# Patient Record
Sex: FEMALE | Race: WHITE | NOT HISPANIC OR LATINO | URBAN - METROPOLITAN AREA
[De-identification: names, ages, dates, MRNs, and addresses within clinical notes are randomized per-mention and may not be internally consistent; named-entity substitution may affect disease eponyms.]

---

## 2018-04-09 PROCEDURE — 87086 URINE CULTURE/COLONY COUNT: CPT | Performed by: INTERNAL MEDICINE

## 2018-05-04 PROCEDURE — 88108 CYTOPATH CONCENTRATE TECH: CPT | Performed by: UROLOGY

## 2018-05-25 PROCEDURE — 88305 TISSUE EXAM BY PATHOLOGIST: CPT | Performed by: UROLOGY

## 2018-09-24 PROCEDURE — 88175 CYTOPATH C/V AUTO FLUID REDO: CPT | Performed by: INTERNAL MEDICINE

## 2019-06-25 ENCOUNTER — WALK IN (OUTPATIENT)
Dept: URGENT CARE | Age: 44
End: 2019-06-25

## 2019-06-25 VITALS
BODY MASS INDEX: 28.61 KG/M2 | HEIGHT: 66 IN | OXYGEN SATURATION: 98 % | RESPIRATION RATE: 15 BRPM | TEMPERATURE: 98.8 F | HEART RATE: 72 BPM | WEIGHT: 178 LBS

## 2019-06-25 DIAGNOSIS — B34.9 PHARYNGITIS WITH VIRAL SYNDROME: Primary | ICD-10-CM

## 2019-06-25 DIAGNOSIS — J02.9 PHARYNGITIS WITH VIRAL SYNDROME: Primary | ICD-10-CM

## 2019-06-25 LAB
INTERNAL PROCEDURAL CONTROLS ACCEPTABLE: YES
S PYO AG THROAT QL IA.RAPID: NEGATIVE

## 2019-06-25 PROCEDURE — 87880 STREP A ASSAY W/OPTIC: CPT | Performed by: NURSE PRACTITIONER

## 2019-06-25 PROCEDURE — 99203 OFFICE O/P NEW LOW 30 MIN: CPT | Performed by: NURSE PRACTITIONER

## 2019-06-25 ASSESSMENT — ENCOUNTER SYMPTOMS
NAUSEA: 1
SINUS PRESSURE: 0
SORE THROAT: 1
CONSTITUTIONAL NEGATIVE: 1
TROUBLE SWALLOWING: 0
HEADACHES: 0
SINUS PAIN: 1
SWOLLEN GLANDS: 1
CHILLS: 0
SHORTNESS OF BREATH: 0
EYES NEGATIVE: 1
WHEEZING: 0
COUGH: 1
FEVER: 0
ALLERGIC/IMMUNOLOGIC NEGATIVE: 1

## 2024-09-24 ENCOUNTER — LAB ENCOUNTER (OUTPATIENT)
Dept: LAB | Age: 49
End: 2024-09-24
Attending: STUDENT IN AN ORGANIZED HEALTH CARE EDUCATION/TRAINING PROGRAM
Payer: COMMERCIAL

## 2024-09-24 DIAGNOSIS — Z01.818 PREOP TESTING: ICD-10-CM

## 2024-09-24 LAB
BASOPHILS # BLD AUTO: 0.07 X10(3) UL (ref 0–0.2)
BASOPHILS NFR BLD AUTO: 0.9 %
DEPRECATED RDW RBC AUTO: 47.1 FL (ref 35.1–46.3)
EOSINOPHIL # BLD AUTO: 0.13 X10(3) UL (ref 0–0.7)
EOSINOPHIL NFR BLD AUTO: 1.6 %
ERYTHROCYTE [DISTWIDTH] IN BLOOD BY AUTOMATED COUNT: 13.2 % (ref 11–15)
HCT VFR BLD AUTO: 38.6 %
HGB BLD-MCNC: 13.1 G/DL
IMM GRANULOCYTES # BLD AUTO: 0.03 X10(3) UL (ref 0–1)
IMM GRANULOCYTES NFR BLD: 0.4 %
LYMPHOCYTES # BLD AUTO: 1.77 X10(3) UL (ref 1–4)
LYMPHOCYTES NFR BLD AUTO: 22.4 %
MCH RBC QN AUTO: 33.6 PG (ref 26–34)
MCHC RBC AUTO-ENTMCNC: 33.9 G/DL (ref 31–37)
MCV RBC AUTO: 99 FL
MONOCYTES # BLD AUTO: 0.59 X10(3) UL (ref 0.1–1)
MONOCYTES NFR BLD AUTO: 7.5 %
NEUTROPHILS # BLD AUTO: 5.31 X10 (3) UL (ref 1.5–7.7)
NEUTROPHILS # BLD AUTO: 5.31 X10(3) UL (ref 1.5–7.7)
NEUTROPHILS NFR BLD AUTO: 67.2 %
PLATELET # BLD AUTO: 269 10(3)UL (ref 150–450)
RBC # BLD AUTO: 3.9 X10(6)UL
WBC # BLD AUTO: 7.9 X10(3) UL (ref 4–11)

## 2024-09-24 PROCEDURE — 36415 COLL VENOUS BLD VENIPUNCTURE: CPT

## 2024-09-24 PROCEDURE — 85025 COMPLETE CBC W/AUTO DIFF WBC: CPT

## 2024-09-29 ENCOUNTER — ANESTHESIA EVENT (OUTPATIENT)
Dept: SURGERY | Facility: HOSPITAL | Age: 49
End: 2024-09-29
Payer: COMMERCIAL

## 2024-09-30 ENCOUNTER — HOSPITAL ENCOUNTER (OUTPATIENT)
Facility: HOSPITAL | Age: 49
Setting detail: HOSPITAL OUTPATIENT SURGERY
Discharge: HOME | End: 2024-09-30
Attending: STUDENT IN AN ORGANIZED HEALTH CARE EDUCATION/TRAINING PROGRAM | Admitting: STUDENT IN AN ORGANIZED HEALTH CARE EDUCATION/TRAINING PROGRAM
Payer: COMMERCIAL

## 2024-09-30 ENCOUNTER — ANESTHESIA (OUTPATIENT)
Dept: SURGERY | Facility: HOSPITAL | Age: 49
End: 2024-09-30
Payer: COMMERCIAL

## 2024-09-30 VITALS
BODY MASS INDEX: 30.53 KG/M2 | TEMPERATURE: 98 F | HEART RATE: 59 BPM | RESPIRATION RATE: 16 BRPM | DIASTOLIC BLOOD PRESSURE: 71 MMHG | HEIGHT: 66 IN | SYSTOLIC BLOOD PRESSURE: 127 MMHG | WEIGHT: 190 LBS | OXYGEN SATURATION: 100 %

## 2024-09-30 DIAGNOSIS — Z01.818 PREOP TESTING: Primary | ICD-10-CM

## 2024-09-30 LAB — B-HCG UR QL: NEGATIVE

## 2024-09-30 PROCEDURE — 81025 URINE PREGNANCY TEST: CPT

## 2024-09-30 PROCEDURE — 0UBC8ZZ EXCISION OF CERVIX, VIA NATURAL OR ARTIFICIAL OPENING ENDOSCOPIC: ICD-10-PCS | Performed by: STUDENT IN AN ORGANIZED HEALTH CARE EDUCATION/TRAINING PROGRAM

## 2024-09-30 PROCEDURE — 88305 TISSUE EXAM BY PATHOLOGIST: CPT | Performed by: STUDENT IN AN ORGANIZED HEALTH CARE EDUCATION/TRAINING PROGRAM

## 2024-09-30 RX ORDER — METOCLOPRAMIDE HYDROCHLORIDE 5 MG/ML
10 INJECTION INTRAMUSCULAR; INTRAVENOUS ONCE
Status: COMPLETED | OUTPATIENT
Start: 2024-09-30 | End: 2024-09-30

## 2024-09-30 RX ORDER — MORPHINE SULFATE 10 MG/ML
6 INJECTION, SOLUTION INTRAMUSCULAR; INTRAVENOUS EVERY 10 MIN PRN
Status: DISCONTINUED | OUTPATIENT
Start: 2024-09-30 | End: 2024-09-30

## 2024-09-30 RX ORDER — FAMOTIDINE 20 MG/1
20 TABLET, FILM COATED ORAL ONCE
Status: COMPLETED | OUTPATIENT
Start: 2024-09-30 | End: 2024-09-30

## 2024-09-30 RX ORDER — PROCHLORPERAZINE EDISYLATE 5 MG/ML
5 INJECTION INTRAMUSCULAR; INTRAVENOUS EVERY 8 HOURS PRN
Status: DISCONTINUED | OUTPATIENT
Start: 2024-09-30 | End: 2024-09-30

## 2024-09-30 RX ORDER — HYDROMORPHONE HYDROCHLORIDE 1 MG/ML
0.6 INJECTION, SOLUTION INTRAMUSCULAR; INTRAVENOUS; SUBCUTANEOUS EVERY 5 MIN PRN
Status: DISCONTINUED | OUTPATIENT
Start: 2024-09-30 | End: 2024-09-30

## 2024-09-30 RX ORDER — ONDANSETRON 2 MG/ML
4 INJECTION INTRAMUSCULAR; INTRAVENOUS EVERY 6 HOURS PRN
Status: DISCONTINUED | OUTPATIENT
Start: 2024-09-30 | End: 2024-09-30

## 2024-09-30 RX ORDER — MORPHINE SULFATE 4 MG/ML
2 INJECTION, SOLUTION INTRAMUSCULAR; INTRAVENOUS EVERY 10 MIN PRN
Status: DISCONTINUED | OUTPATIENT
Start: 2024-09-30 | End: 2024-09-30

## 2024-09-30 RX ORDER — HYDROMORPHONE HYDROCHLORIDE 1 MG/ML
0.2 INJECTION, SOLUTION INTRAMUSCULAR; INTRAVENOUS; SUBCUTANEOUS EVERY 5 MIN PRN
Status: DISCONTINUED | OUTPATIENT
Start: 2024-09-30 | End: 2024-09-30

## 2024-09-30 RX ORDER — KETOROLAC TROMETHAMINE 30 MG/ML
INJECTION, SOLUTION INTRAMUSCULAR; INTRAVENOUS AS NEEDED
Status: DISCONTINUED | OUTPATIENT
Start: 2024-09-30 | End: 2024-09-30 | Stop reason: SURG

## 2024-09-30 RX ORDER — FAMOTIDINE 10 MG/ML
20 INJECTION, SOLUTION INTRAVENOUS ONCE
Status: COMPLETED | OUTPATIENT
Start: 2024-09-30 | End: 2024-09-30

## 2024-09-30 RX ORDER — SODIUM CHLORIDE, SODIUM LACTATE, POTASSIUM CHLORIDE, CALCIUM CHLORIDE 600; 310; 30; 20 MG/100ML; MG/100ML; MG/100ML; MG/100ML
INJECTION, SOLUTION INTRAVENOUS CONTINUOUS
Status: DISCONTINUED | OUTPATIENT
Start: 2024-09-30 | End: 2024-09-30

## 2024-09-30 RX ORDER — HYDROMORPHONE HYDROCHLORIDE 1 MG/ML
0.4 INJECTION, SOLUTION INTRAMUSCULAR; INTRAVENOUS; SUBCUTANEOUS EVERY 5 MIN PRN
Status: DISCONTINUED | OUTPATIENT
Start: 2024-09-30 | End: 2024-09-30

## 2024-09-30 RX ORDER — NALOXONE HYDROCHLORIDE 0.4 MG/ML
80 INJECTION, SOLUTION INTRAMUSCULAR; INTRAVENOUS; SUBCUTANEOUS AS NEEDED
Status: DISCONTINUED | OUTPATIENT
Start: 2024-09-30 | End: 2024-09-30

## 2024-09-30 RX ORDER — ACETAMINOPHEN 500 MG
1000 TABLET ORAL ONCE
Status: COMPLETED | OUTPATIENT
Start: 2024-09-30 | End: 2024-09-30

## 2024-09-30 RX ORDER — ONDANSETRON 2 MG/ML
INJECTION INTRAMUSCULAR; INTRAVENOUS AS NEEDED
Status: DISCONTINUED | OUTPATIENT
Start: 2024-09-30 | End: 2024-09-30 | Stop reason: SURG

## 2024-09-30 RX ORDER — MORPHINE SULFATE 4 MG/ML
4 INJECTION, SOLUTION INTRAMUSCULAR; INTRAVENOUS EVERY 10 MIN PRN
Status: DISCONTINUED | OUTPATIENT
Start: 2024-09-30 | End: 2024-09-30

## 2024-09-30 RX ORDER — LIDOCAINE HYDROCHLORIDE 10 MG/ML
INJECTION, SOLUTION EPIDURAL; INFILTRATION; INTRACAUDAL; PERINEURAL AS NEEDED
Status: DISCONTINUED | OUTPATIENT
Start: 2024-09-30 | End: 2024-09-30 | Stop reason: SURG

## 2024-09-30 RX ORDER — METOCLOPRAMIDE 10 MG/1
10 TABLET ORAL ONCE
Status: COMPLETED | OUTPATIENT
Start: 2024-09-30 | End: 2024-09-30

## 2024-09-30 RX ADMIN — KETOROLAC TROMETHAMINE 30 MG: 30 INJECTION, SOLUTION INTRAMUSCULAR; INTRAVENOUS at 09:54:00

## 2024-09-30 RX ADMIN — LIDOCAINE HYDROCHLORIDE 50 MG: 10 INJECTION, SOLUTION EPIDURAL; INFILTRATION; INTRACAUDAL; PERINEURAL at 09:02:00

## 2024-09-30 RX ADMIN — ONDANSETRON 4 MG: 2 INJECTION INTRAMUSCULAR; INTRAVENOUS at 09:20:00

## 2024-09-30 RX ADMIN — SODIUM CHLORIDE, SODIUM LACTATE, POTASSIUM CHLORIDE, CALCIUM CHLORIDE: 600; 310; 30; 20 INJECTION, SOLUTION INTRAVENOUS at 09:52:00

## 2024-09-30 NOTE — ANESTHESIA PREPROCEDURE EVALUATION
Anesthesia PreOp Note    HPI:     Constanza Galvez is a 49 year old female who presents for preoperative consultation requested by: Madeleine Jacobson DO    Date of Surgery: 9/30/2024    Procedure(s):  Hysteroscopy with polypectomy  Indication: Menorrhagia, uterine polyp    Relevant Problems   No relevant active problems       NPO:  Last Liquid Consumption Date: 09/29/24  Last Liquid Consumption Time: 1830  Last Solid Consumption Date: 09/29/24  Last Solid Consumption Time: 1830  Last Liquid Consumption Date: 09/29/24          History Review:  Patient Active Problem List    Diagnosis Date Noted    GBS (group B streptococcus) UTI complicating pregnancy (HCC) 04/23/2012       Past Medical History:    ANEMIA    BACK PAIN    VARICELLA    at age 12       Past Surgical History:   Procedure Laterality Date    Anesth,knee arthroscopy      left/lateral release    Colonoscopy      Other surgical history  05/25/2018    Cysto- Dr. Darby    Repair femoral hernia,reducible      right       Medications Prior to Admission   Medication Sig Dispense Refill Last Dose    APPLE CIDER VINEGAR OR Take by mouth.   9/23/2024    TURMERIC OR Take by mouth.   9/23/2024    Probiotic Product (PROBIOTIC ACIDOPHILUS BEADS OR) Take by mouth.   9/23/2024    Calcium Carbonate (CALCIUM 500 OR) Take  by mouth.   9/23/2024    Omega-3 Fatty Acids (FISH OIL OR) daily.   9/23/2024    MULTIVITAMIN/MINERALS OR daily   9/23/2024     Current Facility-Administered Medications Ordered in Epic   Medication Dose Route Frequency Provider Last Rate Last Admin    lactated ringers infusion   Intravenous Continuous Madeleine Jacobson DO 20 mL/hr at 09/30/24 0730 New Bag at 09/30/24 0730     No current [facility]-ordered outpatient medications on file.       No Known Allergies    Family History   Problem Relation Age of Onset    Cancer Paternal Grandmother         breast and lung    Breast Cancer Paternal Grandmother 55        55    Cancer Maternal Grandfather         lymphoma     Diabetes Father     Hypertension Father     Hypertension Mother     Arthritis Mother         rheumatoid arthritis    Cancer Paternal Grandfather         pancreatic     Social History     Socioeconomic History    Marital status:    Occupational History    Occupation:    Tobacco Use    Smoking status: Never    Smokeless tobacco: Never   Vaping Use    Vaping status: Never Used   Substance and Sexual Activity    Alcohol use: Yes     Alcohol/week: 0.0 standard drinks of alcohol     Comment: 4-6 wine/week    Drug use: No    Sexual activity: Yes     Partners: Male     Birth control/protection: Pill       Available pre-op labs reviewed.  Lab Results   Component Value Date    WBC 7.9 09/24/2024    RBC 3.90 09/24/2024    HGB 13.1 09/24/2024    HCT 38.6 09/24/2024    MCV 99.0 09/24/2024    MCH 33.6 09/24/2024    MCHC 33.9 09/24/2024    RDW 13.2 09/24/2024    .0 09/24/2024    URINEPREG Negative 09/30/2024             Vital Signs:  Body mass index is 30.67 kg/m².   height is 1.676 m (5' 6\") and weight is 86.2 kg (190 lb). Her oral temperature is 97.7 °F (36.5 °C). Her blood pressure is 143/74 and her pulse is 63. Her respiration is 12 and oxygen saturation is 99%.   Vitals:    09/23/24 1056 09/30/24 0755   BP:  143/74   Pulse:  63   Resp:  12   Temp:  97.7 °F (36.5 °C)   TempSrc:  Oral   SpO2:  99%   Weight: 86.2 kg (190 lb) 86.2 kg (190 lb)   Height: 1.676 m (5' 6\") 1.676 m (5' 6\")        Anesthesia Evaluation     Patient summary reviewed and Nursing notes reviewed    Airway   Mallampati: II  Dental      Pulmonary - negative ROS   Cardiovascular   Exercise tolerance: good    NYHA Classification: I    Neuro/Psych - negative ROS     GI/Hepatic/Renal - negative ROS     Endo/Other - negative ROS   Abdominal                  Anesthesia Plan:   ASA:  2  Plan:   MAC  Post-op Pain Management: IV analgesics      I have informed Constanza Galvez and/or legal guardian or family member of the nature of the  anesthetic plan, benefits, risks including possible dental damage if relevant, major complications, and any alternative forms of anesthetic management.   All of the patient's questions were answered to the best of my ability. The patient desires the anesthetic management as planned.  CARRIE ALEXANDRE MD  9/30/2024 8:25 AM  Present on Admission:  **None**

## 2024-09-30 NOTE — H&P
[facility]  part of [facility]    History & Physical    Constanza Galvez Patient Status:  Hospital Outpatient Surgery    3/24/1975 MRN [id number]   Location [facility] PRE OP RECOVERY Attending Madeleine Jacobson DO   Hosp Day # 0 PCP Mackenzie Coko MD        Date:  2024  Date of Admission:  2024    History provided by:patient  Chief Complaint:   Scheduled surgery    HPI:   Constanza Galvez is a(n) 49 year old female presents for scheduled hysteroscopy dilation, curettage, and polypectomy.   Heavy menstrual bleeding x1 year > scheduled and cancelled previously due to insurance issues.     History     Past Medical History:    ANEMIA    BACK PAIN    VARICELLA    at age 12     Past Surgical History:   Procedure Laterality Date    Anesth,knee arthroscopy      left/lateral release    Colonoscopy      Other surgical history  2018    Cysto- Dr. Darby    Repair femoral hernia,reducible      right     Family History   Problem Relation Age of Onset    Cancer Paternal Grandmother         breast and lung    Breast Cancer Paternal Grandmother 55        55    Cancer Maternal Grandfather         lymphoma    Diabetes Father     Hypertension Father     Hypertension Mother     Arthritis Mother         rheumatoid arthritis    Cancer Paternal Grandfather         pancreatic     Social History:  Social History     Socioeconomic History    Marital status:    Occupational History    Occupation:    Tobacco Use    Smoking status: Never    Smokeless tobacco: Never   Vaping Use    Vaping status: Never Used   Substance and Sexual Activity    Alcohol use: Yes     Alcohol/week: 0.0 standard drinks of alcohol     Comment: 4-6 wine/week    Drug use: No    Sexual activity: Yes     Partners: Male     Birth control/protection: Pill   Social History Narrative    , 3 sons.  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  Going to gym 2-3x for exercise.         Allergies/Medications:    Allergies: No Known Allergies  Medications Prior to Admission   Medication Sig    APPLE CIDER VINEGAR OR Take by mouth.    TURMERIC OR Take by mouth.    Probiotic Product (PROBIOTIC ACIDOPHILUS BEADS OR) Take by mouth.    Calcium Carbonate (CALCIUM 500 OR) Take  by mouth.    Omega-3 Fatty Acids (FISH OIL OR) daily.    MULTIVITAMIN/MINERALS OR daily       Review of Systems:   Pertinent items are noted in HPI.    Physical Exam:   Vital Signs:  Blood pressure 143/74, pulse 63, temperature 97.7 °F (36.5 °C), temperature source Oral, resp. rate 12, height 5' 6\" (1.676 m), weight 190 lb (86.2 kg), last menstrual period 08/28/2024, SpO2 99%.     General: No acute distress. Alert and oriented x 3.  HEENT: Moist mucous membranes. EOM-I. PERRL  Respiratory: Clear to auscultation bilaterally.  No wheezes. No rhonchi.  Cardiovascular: regular rate, rhythm  Abdomen: Soft, nontender, nondistended.    Neurologic: No focal neurological deficits.  Musculoskeletal: Full range of motion of all extremities.  No swelling noted.  Integument: No lesions. No erythema.  Psychiatric: Appropriate mood and affect.                  Results:     Lab Results   Component Value Date    WBC 7.9 09/24/2024    HGB 13.1 09/24/2024    HCT 38.6 09/24/2024    .0 09/24/2024    CREATSERUM 0.63 10/19/2021    BUN 10.0 10/19/2021     10/19/2021    K 4.22 10/19/2021     10/19/2021    CO2 25.7 10/19/2021    GLU 82 10/19/2021    CA 9.5 10/19/2021    ALB 4.0 08/05/2016    ALKPHO 82 08/05/2016    BILT 0.73 08/05/2016    TP 7.5 08/05/2016    AST 19 08/05/2016    ALT 19 08/05/2016    T4F 1.09 03/20/2012    TSH 0.835 08/05/2016    RPR Non Reactive 07/26/2012       No results found.        FINDINGS:     UTERUS:   The uterus is anteverted and measures 8.7 x 5.3 x 3.8 cm. Total volume is 90.6 mL.   The myometrium is homogeneous.   The endometrial echo complex measures up to 8.4 mm. It demonstrates some mild heterogeneity within   the area of  homogeneous hyperechogenicity along the posterior endometrial wall just to left of   midline measuring 0.5 x 0.5 x 0.6 cm without significant vascularity.     RIGHT OVARY:   The right ovary measures 3.1 x 2.4 x 1.3 cm. Total volume is 5.1 mL.        LEFT OVARY:   The left ovary measures 2.1 x 1.3 x 1.3 cm. Total volume is 1.8 mL.     Assessment/Plan:     * No active hospital problems. *    48 yo with abnormal uterine bleeding secondary to endometrial polyp     - Discussed risks of surgery including infection, bleeding, perforation. Consents signed.   - Same day surgery  - No antibiotics      Madeleine Jacobson DO  9/30/2024

## 2024-09-30 NOTE — OPERATIVE REPORT
[facility]  part of [facility]     Operative Note    Constanza Galvez Patient Status:  Hospital Outpatient Surgery    3/24/1975 MRN [id number]   Location [facility] [place] OPERATING ROOM Attending Madeleine Jacobson DO   Hosp Day # 0 PCP Mackenzie Cook MD     Pre Op Diagnosis: Heavy menstrual bleeding    Post Op Diagnosis:  Endocervical polyp    Procedure:  Procedure(s):  Hysteroscopy with polypectomy    Surgeon:  Madeleine Jacobson DO    Anesthesia:  MAC      Patient was consented and taken to the operating room.   She received MAC anesthesia and was positioned in the dorsal lithotomy position. She was prepped and draped in the normal usual fashion.   Bladder was drained with straight catheter. Speculum was placed in the vagina and cervical polyp was noted with a broad base from the ecto and endocervix. Ring forcep was used to remove the portion of the external cervix with gently turning traction.   Hysteroscopy was then introduced into the uterus and small base of polyp just in endocervix was noted.  Endometrial cavity was surveyed. Soft tissue mini shaver was used to remove remaining portion of the endocervical polyp. There was also a small polyp noted in the posterior left lateral wall of the endometrium that was shaved. Hysteroscope then removed. Sharp curettage was performed of the uterus.   After removal of all instruments there was small oozing bleeding noted from the site of the ectocervical polyp that was not controlled with pressure. A single figure of 8 suture with 3-0 Vicryl rapide was placed to obtain hemostasis.   All instruments removed from the vagina.     Complications: none     EBL: 25 cc    Specimens:   Cervical Polyp  Endometrial polyp and curetting    Findings:  Broads based ecto/endo cervical polyp at the 7oclock position  Small posterior lateral wall endometrial polyp      Madeleine Jacobson DO

## 2024-09-30 NOTE — DISCHARGE INSTRUCTIONS
What happens after hysteroscopy?  You may have cramps and bleeding for short time after the procedure. This is normal. Use pads instead of tampons.  Don't douche or use tampons until your healthcare provider says it’s OK.  Don't use any vaginal medicines until you are told it’s OK.  Ask your healthcare provider when it’s OK to have sex again.  No baths or tubes. Only shower until your provider says its ok     When to call your healthcare provider  Call your healthcare provider if any of the following occur:  Heavy bleeding (more than 1 pad an hour for 2 or more hours)  A fever of 100.4°F ( 38.0°C) or higher, or as directed by your provider  Increasing belly (abdominal) pain or soreness  Bad-smelling discharge  Follow-up care  Schedule a follow-up visit with your healthcare provider. Based on your test results, you may need more treatment. Be sure to follow directions and keep your appointments.      [facility] HOME CARE INSTRUCTIONS: POST-OP ANESTHESIA  The medication that you received for sedation or general anesthesia can last up to 24 hours. Your judgment and reflexes may be altered, even if you feel like your normal self.      We Recommend:   Do not drive any motor vehicle or bicycle   Avoid mowing the lawn, playing sports, or working with power tools/applicances (power saws, electric knives or mixers)   That you have someone stay with you on your first night home   Do not drink alcohol or take sleeping pills or tranquilizers   Do not sign legal documents within 24 hours of your procedure   If you had a nerve block for your surgery, take extra care not to put any pressure on your arm or hand for 24 hours    It is normal:  For you to have a sore throat if you had a breathing tube during surgery (while you were asleep!). The sore throat should get better within 48 hours. You can gargle with warm salt water (1/2 tsp in 4 oz warm water) or use a throat lozenge for comfort  To feel muscle aches or soreness especially in  the abdomen, chest or neck. The achy feeling should go away in the next 24 hours  To feel weak, sleepy or \"wiped out\". Your should start feeling better in the next 24 hours.   To experience mild discomforts such as sore lip or tongue, headache, cramps, gas pains or a bloated feeling in your abdomen.   To experience mild back pain or soreness for a day or two if you had spinal or epidural anesthesia.   If you had laparoscopic surgery, to feel shoulder pain or discomfort on the day of surgery.   For some patients to have nausea after surgery/anesthesia    If you feel nausea or experience vomiting:   Try to move around less.   Eat less than usual or drink only liquids until the next morning   Nausea should resolve in about 24 hours    If you have a problem when you are at home:    Call your surgeons office       Discharge Instructions: After Your Surgery  You’ve just had surgery. During surgery, you were given medicine called anesthesia to keep you relaxed and free of pain. After surgery, you may have some pain or nausea. This is common. Here are some tips for feeling better and getting well after surgery.   Going home  Your healthcare provider will show you how to take care of yourself when you go home. They'll also answer your questions. Have an adult family member or friend drive you home. For the first 24 hours after your surgery:   Don't drive or use heavy equipment.  Don't make important decisions or sign legal papers.  Take medicines as directed.  Don't drink alcohol.  Have someone stay with you, if needed. They can watch for problems and help keep you safe.  Be sure to go to all follow-up visits with your healthcare provider. And rest after your surgery for as long as your provider tells you to.   Coping with pain  If you have pain after surgery, pain medicine will help you feel better. Take it as directed, before pain becomes severe. Also, ask your healthcare provider or pharmacist about other ways to control  pain. This might be with heat, ice, or relaxation. And follow any other instructions your surgeon or nurse gives you.      Stay on schedule with your medicine.     Tips for taking pain medicine  To get the best relief possible, remember these points:   Pain medicines can upset your stomach. Taking them with a little food may help.  Most pain relievers taken by mouth need at least 20 to 30 minutes to start to work.  Don't wait till your pain becomes severe before you take your medicine. Try to time your medicine so that you can take it before starting an activity. This might be before you get dressed, go for a walk, or sit down for dinner.  Constipation is a common side effect of some pain medicines. Call your healthcare provider before taking any medicines such as laxatives or stool softeners to help ease constipation. Also ask if you should skip any foods. Drinking lots of fluids and eating foods such as fruits and vegetables that are high in fiber can also help. Remember, don't take laxatives unless your surgeon has prescribed them.  Drinking alcohol and taking pain medicine can cause dizziness and slow your breathing. It can even be deadly. Don't drink alcohol while taking pain medicine.  Pain medicine can make you react more slowly to things. Don't drive or run machinery while taking pain medicine.  Your healthcare provider may tell you to take acetaminophen to help ease your pain. Ask them how much you're supposed to take each day. Acetaminophen or other pain relievers may interact with your prescription medicines or other over-the-counter (OTC) medicines. Some prescription medicines have acetaminophen and other ingredients in them. Using both prescription and OTC acetaminophen for pain can cause you to accidentally overdose. Read the labels on your OTC medicines with care. This will help you to clearly know the list of ingredients, how much to take, and any warnings. It may also help you not take too much  acetaminophen. If you have questions or don't understand the information, ask your pharmacist or healthcare provider to explain it to you before you take the OTC medicine.   Managing nausea  Some people have an upset stomach (nausea) after surgery. This is often because of anesthesia, pain, or pain medicine, less movement of food in the stomach, or the stress of surgery. These tips will help you handle nausea and eat healthy foods as you get better. If you were on a special food plan before surgery, ask your healthcare provider if you should follow it while you get better. Check with your provider on how your eating should progress. It may depend on the surgery you had. These general tips may help:   Don't push yourself to eat. Your body will tell you when to eat and how much.  Start off with clear liquids and soup. They're easier to digest.  Next try semi-solid foods as you feel ready. These include mashed potatoes, applesauce, and gelatin.  Slowly move to solid foods. Don’t eat fatty, rich, or spicy foods at first.  Don't force yourself to have 3 large meals a day. Instead eat smaller amounts more often.  Take pain medicines with a small amount of solid food, such as crackers or toast. This helps prevent nausea.  When to call your healthcare provider  Call your healthcare provider right away if you have any of these:   You still have too much pain, or the pain gets worse, after taking the medicine. The medicine may not be strong enough. Or there may be a complication from the surgery.  You feel too sleepy, dizzy, or groggy. The medicine may be too strong.  Side effects such as nausea or vomiting. Your healthcare provider may advise taking other medicines to .  Skin changes such as rash, itching, or hives. This may mean you have an allergic reaction. Your provider may advise taking other medicines.  The incision looks different (for instance, part of it opens up).  Bleeding or fluid leaking from the incision site,  and weren't told to expect that.  Fever of 100.4°F (38°C) or higher, or as directed by your provider.  Call 911  Call 911 right away if you have:   Trouble breathing  Facial swelling    If you have obstructive sleep apnea   You were given anesthesia medicine during surgery to keep you comfortable and free of pain. After surgery, you may have more apnea spells because of this medicine and other medicines you were given. The spells may last longer than normal.    At home:  Keep using the continuous positive airway pressure (CPAP) device when you sleep. Unless your healthcare provider tells you not to, use it when you sleep, day or night. CPAP is a common device used to treat obstructive sleep apnea.  Talk with your provider before taking any pain medicine, muscle relaxants, or sedatives. Your provider will tell you about the possible dangers of taking these medicines.  Contact your provider if your sleeping changes a lot even when taking medicines as directed.  Gail last reviewed this educational content on 10/1/2021  © 3564-3127 [employer]. All rights reserved. This information is not intended as a substitute for professional medical care. Always follow your healthcare professional's instructions.

## 2024-09-30 NOTE — ANESTHESIA POSTPROCEDURE EVALUATION
Patient: Constanza Galvez    Procedure Summary       Date: 09/30/24 Room / Location: [facility] MAIN OR  / [facility] MAIN OR    Anesthesia Start: 0856 Anesthesia Stop:     Procedure: Hysteroscopy with polypectomy (Vagina ) Diagnosis: (Menorrhagia, uterine polyp)    Surgeons: Madeleine Jacobson DO Anesthesiologist: Madan Cope MD    Anesthesia Type: MAC ASA Status: 2            Anesthesia Type: MAC    Vitals Value Taken Time   /70 09/30/24 1003   Temp 97 09/30/24 1003   Pulse 70 09/30/24 1002   Resp 18 09/30/24 1003   SpO2 98 % 09/30/24 1002   Vitals shown include unfiled device data.    [facility] AN Post Evaluation:   Patient Evaluated in PACU  Patient Participation: complete - patient participated  Level of Consciousness: awake  Pain Management: adequate  Airway Patency:patent  Dental exam unchanged from preop  Yes    Nausea/Vomiting: none  Cardiovascular Status: acceptable and hemodynamically stable  Respiratory Status: nasal cannula and spontaneous ventilation  Postoperative Hydration acceptable      Elif Hughes CRNA  9/30/2024 10:03 AM

## (undated) DEVICE — HYSTEROSCOPY: Brand: MEDLINE INDUSTRIES, INC.

## (undated) DEVICE — KIT HYSTEROSCOPIC PROC INCL INFLO OUTFLO TB

## (undated) DEVICE — CANISTER SUCT 3000CC HI FLO DISP FOR FLD MGMT

## (undated) DEVICE — GLOVE SUR 6.5 SENSICARE PIP WHT PWD F

## (undated) DEVICE — SOLUTION IRRIG 3000ML 0.9% NACL FLX CONT

## (undated) DEVICE — 1016 S-DRAPE IRRIG POUCH 10/BOX: Brand: STERI-DRAPE™

## (undated) DEVICE — GLOVE SUR 6 SENSICARE PI PIP CRM PWD F

## (undated) DEVICE — SOFT TISSUE SHAVER MINI: Brand: TRUCLEAR